# Patient Record
Sex: FEMALE | Race: BLACK OR AFRICAN AMERICAN | NOT HISPANIC OR LATINO | Employment: FULL TIME | URBAN - METROPOLITAN AREA
[De-identification: names, ages, dates, MRNs, and addresses within clinical notes are randomized per-mention and may not be internally consistent; named-entity substitution may affect disease eponyms.]

---

## 2017-09-21 ENCOUNTER — OFFICE VISIT (OUTPATIENT)
Dept: OCCUPATIONAL MEDICINE | Facility: CLINIC | Age: 52
End: 2017-09-21

## 2017-09-21 VITALS
WEIGHT: 170 LBS | SYSTOLIC BLOOD PRESSURE: 129 MMHG | BODY MASS INDEX: 33.38 KG/M2 | DIASTOLIC BLOOD PRESSURE: 82 MMHG | RESPIRATION RATE: 12 BRPM | OXYGEN SATURATION: 98 % | HEART RATE: 68 BPM | HEIGHT: 60 IN

## 2017-09-21 DIAGNOSIS — R52 PAIN: ICD-10-CM

## 2017-09-21 DIAGNOSIS — S46.212A BICEPS STRAIN, LEFT, INITIAL ENCOUNTER: Primary | ICD-10-CM

## 2017-09-21 PROCEDURE — 29125 APPL SHORT ARM SPLINT STATIC: CPT | Mod: LT,S$GLB,, | Performed by: NURSE PRACTITIONER

## 2017-09-21 PROCEDURE — 80305 DRUG TEST PRSMV DIR OPT OBS: CPT | Mod: S$GLB,,, | Performed by: NURSE PRACTITIONER

## 2017-09-21 PROCEDURE — 3074F SYST BP LT 130 MM HG: CPT | Mod: S$GLB,,, | Performed by: NURSE PRACTITIONER

## 2017-09-21 PROCEDURE — 3008F BODY MASS INDEX DOCD: CPT | Mod: S$GLB,,, | Performed by: NURSE PRACTITIONER

## 2017-09-21 PROCEDURE — 3079F DIAST BP 80-89 MM HG: CPT | Mod: S$GLB,,, | Performed by: NURSE PRACTITIONER

## 2017-09-21 PROCEDURE — 99203 OFFICE O/P NEW LOW 30 MIN: CPT | Mod: 25,S$GLB,, | Performed by: NURSE PRACTITIONER

## 2017-09-21 RX ORDER — IBUPROFEN 200 MG
600 TABLET ORAL
COMMUNITY
Start: 2017-09-21

## 2017-09-21 RX ORDER — TELMISARTAN AND HYDROCHLORTHIAZIDE 80; 25 MG/1; MG/1
1 TABLET ORAL DAILY
COMMUNITY

## 2017-09-21 NOTE — PROGRESS NOTES
Subjective:       Patient ID: Katia Arthur is a 52 y.o. female.    Chief Complaint: Arm Pain (09/21/17 onset)    Pt is a nurse at [employer].  She states she was doing some body mechanics exercises at an in-service this morning when she thinks she pulled a muscle in her LEFT HUMERUS. CT She felt a pop over the bicep at that time. Continues to have pain to left upper arm. MWT      Arm Pain    The incident occurred 6 to 12 hours ago. The incident occurred at work. There was no injury mechanism. The pain is present in the upper left arm. The quality of the pain is described as aching. The pain does not radiate. The pain is at a severity of 8/10. The pain is moderate. The pain has been constant since the incident. Associated symptoms include muscle weakness. The symptoms are aggravated by movement and lifting. She has tried nothing for the symptoms. The treatment provided no relief.     Review of Systems   Skin: Negative for color change.   Musculoskeletal: Positive for muscle cramps, muscle weakness and stiffness.       Objective:      Physical Exam   Constitutional: She is oriented to person, place, and time. She appears well-developed and well-nourished.   HENT:   Right Ear: External ear normal.   Left Ear: External ear normal.   Nose: Nose normal.   Eyes: Conjunctivae are normal.   Cardiovascular: Normal rate.    Murmur heard.  Pulmonary/Chest: Effort normal and breath sounds normal.   Abdominal: Soft. Bowel sounds are normal.   Musculoskeletal: Normal range of motion. She exhibits tenderness.        Left shoulder: Normal.        Left elbow: She exhibits normal range of motion, no swelling and no deformity. No tenderness found. No medial epicondyle, no lateral epicondyle and no olecranon process tenderness noted.        Left upper arm: She exhibits tenderness and swelling.        Arms:  Neurological: She is alert and oriented to person, place, and time.   Skin: Skin is warm and dry. Capillary  refill takes less than 2 seconds. No erythema.   Psychiatric: She has a normal mood and affect. Her behavior is normal.       Assessment:       1. Biceps strain, left, initial encounter    2. Pain        Plan:           Medications Ordered This Encounter      ibuprofen (ADVIL) 200 MG tablet          Sig: Take 3 tablets (600 mg total) by mouth every meal as needed for Pain (Monitor BP).  Patient Instructions: Apply ice 24-48 hours then apply heat/warm soaks   Restrictions: Limited use of left hand and arm, No lifting/pushing/pulling more than 10 lbs  Return in about 1 week (around 9/28/2017).    Ace bandage. Ice 15 min several times a day through Saturday then warm soaks.

## 2017-09-21 NOTE — LETTER
[facility] [place] [address]  Phone: [phone]  Fax: [phone]    Pt Name: Katia Arthur  Injury Date: 09/21/17   Employee ID:  Date of First Treatment: 09/21/2017   Company: [employer].            Appointment Time: 02:40 PM Arrived:  2:55 PM CDT   Physician: Alisha Juarez NP DEPARTED: 1700 PM           Office Treatment: Katia was seen today for arm pain.    Diagnoses and all orders for this visit: EXAM, DRUG SCREEN, ELASTIC BANDAGE, LIGHT DUTY     Biceps strain, left, initial encounter  -     ibuprofen (ADVIL) 200 MG tablet; Take 3 tablets (600 mg total) by mouth every meal as needed for Pain (Monitor BP).  Pain  -     X-Ray Humerus 2 View Left;  -     X-Ray Elbow Complete 3 view Left;      Patient Instructions: Apply ice 24-48 hours then apply heat/warm soaks    Restrictions: Limited use of left hand and arm, No lifting/pushing/pulling more than 10 lbs       Return Appointment:   09/28/17 @ 0830 AM

## 2017-09-28 ENCOUNTER — OFFICE VISIT (OUTPATIENT)
Dept: OCCUPATIONAL MEDICINE | Facility: CLINIC | Age: 52
End: 2017-09-28
Payer: OTHER MISCELLANEOUS

## 2017-09-28 DIAGNOSIS — M79.602 PAIN OF LEFT ARM: ICD-10-CM

## 2017-09-28 DIAGNOSIS — S46.212D BICEPS STRAIN, LEFT, SUBSEQUENT ENCOUNTER: Primary | ICD-10-CM

## 2017-09-28 PROCEDURE — 99213 OFFICE O/P EST LOW 20 MIN: CPT | Mod: S$GLB,,, | Performed by: PREVENTIVE MEDICINE

## 2017-09-28 PROCEDURE — 3008F BODY MASS INDEX DOCD: CPT | Mod: S$GLB,,, | Performed by: PREVENTIVE MEDICINE

## 2017-09-28 NOTE — PROGRESS NOTES
Subjective:       Patient ID: Katia Arthur is a 52 y.o. female.    Chief Complaint: Arm Pain (left)    Patient states that she still is experiencing arm pain to the left bicep and forearm with limited ROM      Arm Pain    Incident onset: 09/21/2017. The incident occurred at work. The injury mechanism was repetitive motion. The pain is present in the left forearm. The quality of the pain is described as aching. The pain is at a severity of 6/10. Pertinent negatives include no chest pain or numbness. She has tried ice and heat for the symptoms. The treatment provided mild relief.     Review of Systems   Constitution: Negative for chills, fever and weakness.   HENT: Negative for congestion, ear pain and nosebleeds.    Eyes: Negative for blurred vision and pain.   Cardiovascular: Negative for chest pain and palpitations.   Respiratory: Negative for cough, shortness of breath and wheezing.    Skin: Negative for dry skin, itching and rash.   Musculoskeletal: Positive for joint pain, muscle weakness, myalgias and stiffness. Negative for arthritis, back pain, gout, joint swelling and neck pain.   Gastrointestinal: Negative for abdominal pain, constipation, diarrhea, nausea and vomiting.   Genitourinary: Negative for dysuria, frequency and hematuria.   Neurological: Negative for dizziness, headaches, numbness and seizures.   Allergic/Immunologic: Negative for hives.   All other systems reviewed and are negative.      Objective:      Physical Exam   Constitutional: She appears well-developed and well-nourished.   HENT:   Head: Normocephalic.   Eyes: Pupils are equal, round, and reactive to light.   Neck: Normal range of motion.   Cardiovascular: Normal rate.    Pulmonary/Chest: Effort normal.   Musculoskeletal:        Lumbar back: She exhibits no bony tenderness, no swelling, no edema, no deformity, no laceration, no spasm and normal pulse.        Left upper arm: She exhibits tenderness. She exhibits no bony  tenderness, no swelling, no edema, no deformity and no laceration.        Arms:  Neurological: She is alert.   No focal neurologic deficits   Skin: Skin is warm.   Psychiatric: She has a normal mood and affect.   Nursing note and vitals reviewed.      Assessment:       1. Biceps strain, left, subsequent encounter    2. Pain of left arm        Plan:            Patient Instructions: Daily home exercises/warm soaks (Continue taking Advil 200mg as needed for pain)   Restrictions: No lifting/pushing/pulling more than 25 lbs, Limited use of left hand and arm  Return in about 1 week (around 10/5/2017).

## 2017-09-28 NOTE — LETTER
[facility]  [place]  [facility] [place] [address]  Phone: [phone]  Fax: [phone]    Pt Name: Katia Arthur     Employee ID: [id number] Date of First Treatment: 09/28/2017   Company: [employer]            Appointment Time: 08:15 AM Arrived:  8:30 AM CDT   Physician: Carlos Becerra MD Time Out: 09:45 AM           Office Treatment: Katia was seen today for arm pain.    Diagnoses and all orders for this visit:    Biceps strain, left, subsequent encounter    Pain of left arm       Patient Instructions: Daily home exercises/warm soaks (Continue taking Advil 200mg as needed for pain)    Restrictions: No lifting/pushing/pulling more than 25 lbs, Limited use of left hand and arm       Return Appointment: 10/5/2017 at 1000 AM

## 2017-10-06 ENCOUNTER — OFFICE VISIT (OUTPATIENT)
Dept: OCCUPATIONAL MEDICINE | Facility: CLINIC | Age: 52
End: 2017-10-06

## 2017-10-06 DIAGNOSIS — S46.212D BICEPS STRAIN, LEFT, SUBSEQUENT ENCOUNTER: Primary | ICD-10-CM

## 2017-10-06 PROCEDURE — 99213 OFFICE O/P EST LOW 20 MIN: CPT | Mod: S$GLB,,, | Performed by: NURSE PRACTITIONER

## 2017-10-06 NOTE — LETTER
[facility]  [place]  [address] [place] [address] [place] [address]  Phone: [phone]  Fax: [phone]    Pt Name: Katia Arthur  Injury Date: 09/21/17   Employee ID:  Date of Treatment: 10/06/2017   Company: [employer].            Appointment Time: 09:45 AM Arrived: 31765: AM CDT   Physician: Santana Soto NP Departed:  1230 PM           Office Treatment: Katia was seen today for arm pain.    Diagnoses and all orders for this visit: EXAM, CONTINUE LIGHT DUTY    Biceps strain, left, subsequent encounter     Patient Instructions: Daily home exercises/warm soaks, Elevated affected area, Attention not to aggravate affected area    Restrictions: No lifting/pushing/pulling more than 10 lbs, No above the shoulder/overhead work, Avoid frequent bending/lifting/twisting       Return Appointment: FOLLOW UP ON 10/13/17 @ 1030 AM

## 2017-10-06 NOTE — PATIENT INSTRUCTIONS
Muscle Strain in the Extremities  A muscle strain is a stretching and tearing of muscle fibers. This causes pain, especially when you move that muscle. There may also be some swelling and bruising.  Home care  · Keep the hurt area raised to reduce pain and swelling. This is especially important during the first 48 hours.  · Apply an ice pack over the injured area for 15 to 20 minutes every 3 to 6 hours. You should do this for the first 24 to 48 hours. You can make an ice pack by filling a plastic bag that seals at the top with ice cubes and then wrapping it with a thin towel. Be careful not to injure your skin with the ice treatments. Ice should never be applied directly to skin. Continue the use of ice packs for relief of pain and swelling as needed. After 48 hours, apply heat (warm shower or warm bath) for 15 to 20 minutes several times a day, or alternate ice and heat.  · You may use over-the-counter pain medicine to control pain, unless another medicine was prescribed. If you have chronic liver or kidney disease or ever had a stomach ulcer or GI bleeding, talk with your healthcare provider before using these medicines.  · For leg strains: If crutches have been recommended, dont put full weight on the hurt leg until you can do so without pain. You can return to sports when you are able to hop and run on the injured leg without pain.  Follow-up care  Follow up with your healthcare provider, or as advised.  When to seek medical advice  Call your healthcare provider right away if any of these occur:  · The toes of the injured leg become swollen, cold, blue, numb, or tingly  · Pain or swelling increases  Date Last Reviewed: 11/19/2015 © 2000-2017 [employer]. [address] [place] [address], [place] [address]. All rights reserved. This information is not intended as a substitute for professional medical care. Always follow your healthcare professional's instructions.        Self-Care for Strains and  Sprains  Most minor strains and sprains can be treated with self-care. Recovering from a strain or sprain may take 6 to 8 weeks. Your self-care goal is to reduce pain and immobilize the injury to speed healing.     A sprain injures ligaments (tissue that connects bones to bones).        A strain injures muscles or tendons (tissue that connects muscles to bones).   Support the injured area  Wrapping the injured area provides support for short, necessary activities. Be careful not to wrap the area too tightly. This could cut off the blood supply.  · Support a wrist, elbow, or shoulder with a sling.  · Wrap an ankle or knee with an elastic bandage.  · Tape a finger or toe to the one next to it.  Use cold and heat  Cold reduces swelling. Both cold and heat reduce pain. Heat should not be used in the initial treatment of the injury. When using cold or heat, always place a towel between the pack and your skin.  · Apply ice or a cold pack 10 to 15 minutes every hour youre awake for the first 2 days.  · After the swelling goes down, use cold or heat to control pain. Dont use heat late in the day, since it can cause swelling when youre not active.  Rest and elevate  Rest and elevation help your injury heal faster.  · Raise the injured area above your heart level.  · Keep the injured area from moving.  · Limit the use of the joint or limb.  Use medicine  · Aspirin reduces pain and swelling. (Note: Dont give aspirin to a child 18 or younger unless prescribed by the doctor.)  · Aspirin substitutes, such as ibuprofen, can reduce pain. Some substitutes reduce swelling, too. Ask your pharmacist which substitutes you can use.  Call your doctor if:  · The injured joint wont move, or bones make a grating sound when they move.  · You cant put weight on the injured area, even after 24 hours.  · The injured body part is cold, blue, or numb.  · The joint or limb appears bent or crooked.  · Pain increases or doesnt improve in 4  days.  · When pressing along the injured area, you notice a spot that is especially painful.   Date Last Reviewed: 9/29/2015  © 1036-7032 [employer]. [address] [place] [address], [place] [address]. All rights reserved. This information is not intended as a substitute for professional medical care. Always follow your healthcare professional's instructions.

## 2017-10-06 NOTE — PROGRESS NOTES
Subjective:       Patient ID: Katia Arthur is a 52 y.o. female.    Chief Complaint: Arm Pain (Follow up from LEFT ARM injury 9/21/17)    Pt is here for a follow up on a LEFT ARM injury from 9/21/17.  She has been doing light duty with no problems.  CT      Arm Pain    The incident occurred more than 1 week ago (left arm injury . Denies previous injury to left arm). The incident occurred at work. The pain is present in the left forearm (left bicep muscle ). The quality of the pain is described as aching and cramping. The pain does not radiate. The pain is at a severity of 4/10. The pain is mild. The pain has been improving since the incident. Associated symptoms include muscle weakness. Pertinent negatives include no chest pain, numbness or tingling. The symptoms are aggravated by movement and lifting. She has tried heat, ice, elevation and NSAIDs for the symptoms. The treatment provided moderate relief.     Review of Systems   Constitution: Negative for chills and fever.   Cardiovascular: Negative for chest pain and dyspnea on exertion.   Respiratory: Negative for cough and shortness of breath.    Endocrine: Negative for polydipsia, polyphagia and polyuria.   Musculoskeletal: Positive for muscle cramps, muscle weakness and stiffness. Negative for falls, joint pain and joint swelling.   Gastrointestinal: Negative for constipation, diarrhea and heartburn.   Neurological: Negative for numbness and tingling.       Objective:      Physical Exam   Constitutional: She is oriented to person, place, and time. She appears well-developed and well-nourished.   Cardiovascular: Normal rate.    Murmur.  Denies s/s.  Denies sob, CP and or dizziness   Pulmonary/Chest: Effort normal and breath sounds normal.   Abdominal: Soft. Bowel sounds are normal.   Musculoskeletal:        Left shoulder: Normal.        Left elbow: Normal.        Left wrist: Normal.        Left upper arm: She exhibits tenderness. She exhibits no bony  tenderness, no swelling, no edema and no laceration.        Arms:  Neurological: She is alert and oriented to person, place, and time. She has normal strength. No cranial nerve deficit or sensory deficit.   Reflex Scores:       Bicep reflexes are 2+ on the right side and 2+ on the left side.       Brachioradialis reflexes are 2+ on the right side and 2+ on the left side.  Skin: Skin is warm, dry and intact.   Psychiatric: She has a normal mood and affect. Her behavior is normal.   Nursing note reviewed.      Assessment:       1. Biceps strain, left, subsequent encounter        Plan:       Katia was seen today for arm pain.    Diagnoses and all orders for this visit:    Biceps strain, left, subsequent encounter      Current Outpatient Prescriptions on File Prior to Visit   Medication Sig Dispense Refill    ibuprofen (ADVIL) 200 MG tablet Take 3 tablets (600 mg total) by mouth every meal as needed for Pain (Monitor BP).      telmisartan-hydrochlorothiazide (MICARDIS HCT) 80-25 mg per tablet Take 1 tablet by mouth once daily.       No current facility-administered medications on file prior to visit.       Ibuprofen as directed and needed  Patient Instructions: Daily home exercises/warm soaks, Elevated affected area, Attention not to aggravate affected area   Restrictions: No lifting/pushing/pulling more than 10 lbs, No above the shoulder/overhead work, Avoid frequent bending/lifting/twisting  Return in about 7 days (around 10/13/2017).

## 2017-10-18 ENCOUNTER — OFFICE VISIT (OUTPATIENT)
Dept: OCCUPATIONAL MEDICINE | Facility: CLINIC | Age: 52
End: 2017-10-18

## 2017-10-18 DIAGNOSIS — M79.602 PAIN OF LEFT ARM: ICD-10-CM

## 2017-10-18 DIAGNOSIS — S46.212D BICEPS STRAIN, LEFT, SUBSEQUENT ENCOUNTER: Primary | ICD-10-CM

## 2017-10-18 PROCEDURE — 99213 OFFICE O/P EST LOW 20 MIN: CPT | Mod: S$GLB,,, | Performed by: NURSE PRACTITIONER

## 2017-10-18 NOTE — PATIENT INSTRUCTIONS
Muscle Strain in the Extremities  A muscle strain is a stretching and tearing of muscle fibers. This causes pain, especially when you move that muscle. There may also be some swelling and bruising.  Home care  · Keep the hurt area raised to reduce pain and swelling. This is especially important during the first 48 hours.  · Apply an ice pack over the injured area for 15 to 20 minutes every 3 to 6 hours. You should do this for the first 24 to 48 hours. You can make an ice pack by filling a plastic bag that seals at the top with ice cubes and then wrapping it with a thin towel. Be careful not to injure your skin with the ice treatments. Ice should never be applied directly to skin. Continue the use of ice packs for relief of pain and swelling as needed. After 48 hours, apply heat (warm shower or warm bath) for 15 to 20 minutes several times a day, or alternate ice and heat.  · You may use over-the-counter pain medicine to control pain, unless another medicine was prescribed. If you have chronic liver or kidney disease or ever had a stomach ulcer or GI bleeding, talk with your healthcare provider before using these medicines.  · For leg strains: If crutches have been recommended, dont put full weight on the hurt leg until you can do so without pain. You can return to sports when you are able to hop and run on the injured leg without pain.  Follow-up care  Follow up with your healthcare provider, or as advised.  When to seek medical advice  Call your healthcare provider right away if any of these occur:  · The toes of the injured leg become swollen, cold, blue, numb, or tingly  · Pain or swelling increases  Date Last Reviewed: 11/19/2015 © 2000-2017 [employer]. [address] [place] [address], [place] [address]. All rights reserved. This information is not intended as a substitute for professional medical care. Always follow your healthcare professional's instructions.        Self-Care for Strains and  Sprains  Most minor strains and sprains can be treated with self-care. Recovering from a strain or sprain may take 6 to 8 weeks. Your self-care goal is to reduce pain and immobilize the injury to speed healing.     A sprain injures ligaments (tissue that connects bones to bones).        A strain injures muscles or tendons (tissue that connects muscles to bones).   Support the injured area  Wrapping the injured area provides support for short, necessary activities. Be careful not to wrap the area too tightly. This could cut off the blood supply.  · Support a wrist, elbow, or shoulder with a sling.  · Wrap an ankle or knee with an elastic bandage.  · Tape a finger or toe to the one next to it.  Use cold and heat  Cold reduces swelling. Both cold and heat reduce pain. Heat should not be used in the initial treatment of the injury. When using cold or heat, always place a towel between the pack and your skin.  · Apply ice or a cold pack 10 to 15 minutes every hour youre awake for the first 2 days.  · After the swelling goes down, use cold or heat to control pain. Dont use heat late in the day, since it can cause swelling when youre not active.  Rest and elevate  Rest and elevation help your injury heal faster.  · Raise the injured area above your heart level.  · Keep the injured area from moving.  · Limit the use of the joint or limb.  Use medicine  · Aspirin reduces pain and swelling. (Note: Dont give aspirin to a child 18 or younger unless prescribed by the doctor.)  · Aspirin substitutes, such as ibuprofen, can reduce pain. Some substitutes reduce swelling, too. Ask your pharmacist which substitutes you can use.  Call your doctor if:  · The injured joint wont move, or bones make a grating sound when they move.  · You cant put weight on the injured area, even after 24 hours.  · The injured body part is cold, blue, or numb.  · The joint or limb appears bent or crooked.  · Pain increases or doesnt improve in 4  days.  · When pressing along the injured area, you notice a spot that is especially painful.   Date Last Reviewed: 9/29/2015  © 0272-1308 [employer]. [address] [place] [address], [place] [address]. All rights reserved. This information is not intended as a substitute for professional medical care. Always follow your healthcare professional's instructions.

## 2017-10-18 NOTE — PROGRESS NOTES
Subjective:       Patient ID: Katia Arthur is a 52 y.o. female.    Chief Complaint: Arm Pain (left)    Patient states that the pain has improved but still aches with frequent ROM      Arm Pain    Incident onset: 09/21/2017. The incident occurred at work. The injury mechanism was twisted. The pain is present in the upper right arm and right forearm. The quality of the pain is described as aching. The pain does not radiate. The pain is at a severity of 4/10. The pain is mild. The pain has been fluctuating since the incident. Associated symptoms include muscle weakness. Pertinent negatives include no chest pain, numbness or tingling. The symptoms are aggravated by movement. She has tried heat for the symptoms. The treatment provided mild relief.     Review of Systems   Constitution: Negative for chills, fever and weakness.   HENT: Negative for congestion, ear pain and nosebleeds.    Eyes: Negative for blurred vision and pain.   Cardiovascular: Negative for chest pain and palpitations.   Respiratory: Negative for cough, shortness of breath and wheezing.    Skin: Negative for dry skin, itching and rash.   Musculoskeletal: Positive for joint pain, muscle weakness and stiffness. Negative for arthritis, back pain, gout, joint swelling and neck pain.   Gastrointestinal: Negative for abdominal pain, constipation, diarrhea, nausea and vomiting.   Genitourinary: Negative for dysuria, frequency and hematuria.   Neurological: Negative for dizziness, headaches, numbness, seizures and tingling.   Allergic/Immunologic: Negative for hives.   All other systems reviewed and are negative.      Objective:      Physical Exam   Constitutional: She is oriented to person, place, and time. She appears well-developed and well-nourished.   Cardiovascular: Normal rate and regular rhythm.    Pulmonary/Chest: Effort normal and breath sounds normal.   Abdominal: Soft. Bowel sounds are normal.   Musculoskeletal: She exhibits tenderness.         Left elbow: Normal.        Arms:  Neurological: She is alert and oriented to person, place, and time.   Skin: Skin is warm and dry.   Psychiatric: She has a normal mood and affect. Her behavior is normal.       Assessment:       1. Biceps strain, left, subsequent encounter    2. Pain of left arm        Plan:     Katia was seen today for arm pain.    Diagnoses and all orders for this visit:    Biceps strain, left, subsequent encounter    Pain of left arm          Katia was seen today for arm pain.    Diagnoses and all orders for this visit:    Biceps strain, left, subsequent encounter    Pain of left arm    PT HAS BEEN REQUESTED PENDING AUTHORIZATION  CONTINUE MEDICATION AS DIRECTED AND AS NEEDED  Patient Instructions: Daily home exercises/warm soaks, PT to be scheduled once authorized   Restrictions: No lifting/pushing/pulling more than 10 lbs, No above the shoulder/overhead work, Avoid frequent bending/lifting/twisting  No Follow-up on file.

## 2017-10-18 NOTE — LETTER
[facility]  [place]  [facility] [place] [address]  Phone: [phone]  Fax: [phone]    Pt Name: Katia Arthur  Injury Date: 09/21/2017   Employee ID: [id number] Date of First Treatment: 10/18/2017   Company: [employer].            Appointment Time: 11:05 AM Arrived: 11:20 AM CDT   Physician: Santana Soto NP Time Out:  13:15 PM           Office Treatment: Katia was seen today for arm pain.    Diagnoses and all orders for this visit:    Biceps strain, left, subsequent encounter    Pain of left arm       Patient Instructions: Daily home exercises/warm soaks, PT to be scheduled once authorized    Restrictions: No lifting/pushing/pulling more than 10 lbs, No above the shoulder/overhead work, Avoid frequent bending/lifting/twisting       Return Appointment: 10/25/2017 at 11:00 AM

## 2017-11-08 ENCOUNTER — OFFICE VISIT (OUTPATIENT)
Dept: OCCUPATIONAL MEDICINE | Facility: CLINIC | Age: 52
End: 2017-11-08
Payer: OTHER MISCELLANEOUS

## 2017-11-08 DIAGNOSIS — M79.602 PAIN OF LEFT ARM: ICD-10-CM

## 2017-11-08 DIAGNOSIS — S46.212D BICEPS STRAIN, LEFT, SUBSEQUENT ENCOUNTER: Primary | ICD-10-CM

## 2017-11-08 PROCEDURE — 99213 OFFICE O/P EST LOW 20 MIN: CPT | Mod: S$GLB,,, | Performed by: NURSE PRACTITIONER

## 2017-11-08 NOTE — PROGRESS NOTES
Subjective:       Patient ID: Katia Arthur is a 52 y.o. female.    Chief Complaint: Arm Pain (FOLLOW UP from injury on 09/21/17)    Pt is here for a FOLLOW UP on a LEFT ARM injury from 09/21/17.  She has begun PT and said it is helping TREMENDOUSLY.  She completed about 3 sessions and is doing well on LIGHT DUTY.  CT      Arm Pain    The incident occurred more than 1 week ago. The incident occurred at work. The injury mechanism was a fall. The pain is present in the left forearm. The quality of the pain is described as aching. The pain does not radiate. The pain is at a severity of 4/10. The pain is mild. The pain has been fluctuating since the incident. Associated symptoms include muscle weakness. Pertinent negatives include no chest pain or numbness. The symptoms are aggravated by movement, palpation and lifting. The treatment provided moderate (BETTER WITH PT) relief.     Review of Systems   Cardiovascular: Negative for chest pain, claudication and dyspnea on exertion.   Respiratory: Negative for cough.    Musculoskeletal: Positive for muscle cramps, muscle weakness and stiffness.   Neurological: Negative for numbness and sensory change.   Psychiatric/Behavioral: Negative for altered mental status. The patient is not nervous/anxious.        Objective:      Physical Exam   Constitutional: She is oriented to person, place, and time. She appears well-developed and well-nourished.   Cardiovascular: Normal rate and regular rhythm.    Pulmonary/Chest: Effort normal and breath sounds normal.   Abdominal: Soft. Bowel sounds are normal.   Musculoskeletal: She exhibits tenderness.        Left shoulder: She exhibits tenderness and decreased strength. She exhibits no bony tenderness, no swelling, no pain and no spasm.        Right elbow: Normal.       Left elbow: She exhibits decreased range of motion. Tenderness found.        Left wrist: She exhibits decreased range of motion. She exhibits no tenderness, no bony  tenderness, no crepitus and no deformity.        Left upper arm: She exhibits tenderness.        Left forearm: She exhibits tenderness. She exhibits no bony tenderness, no swelling and no deformity.        Arms:       Left hand: She exhibits normal range of motion, no tenderness, no bony tenderness, normal capillary refill and no swelling. Normal sensation noted. Normal strength noted.   Neurological: She is alert and oriented to person, place, and time. No cranial nerve deficit or sensory deficit. GCS eye subscore is 4. GCS verbal subscore is 5. GCS motor subscore is 6.   Reflex Scores:       Tricep reflexes are 2+ on the right side and 2+ on the left side.       Bicep reflexes are 2+ on the right side and 2+ on the left side.  Skin: Skin is warm and dry. Capillary refill takes less than 2 seconds.   Psychiatric: She has a normal mood and affect. Her behavior is normal.       Assessment:       1. Biceps strain, left, subsequent encounter    2. Pain of left arm        Plan:       Katia was seen today for arm pain.    Diagnoses and all orders for this visit:    Biceps strain, left, subsequent encounter    Pain of left arm         Patient Instructions: Continue Physical Therapy, Attention not to aggravate affected area, Daily home exercises/warm soaks   Restrictions: Limited use of left hand and arm, No lifting/pushing/pulling more than 10 lbs, No above the shoulder/overhead work, Avoid frequent bending/lifting/twisting  Return in about 2 weeks (around 11/21/2017).

## 2017-11-08 NOTE — LETTER
[facility]  [place]  [address] [place] [address] [place] [address]  Phone: [phone]  Fax: [phone]    Pt Name: Katia Arthur  Injury Date: 09/21/17   Employee ID:  Date of Treatment: 11/08/2017   Company: [employer].            Appointment Time: 11:15 AM Arrived: 11:30 AM CST   Physician: Santana Soto NP Departed: 1400 PM           Office Treatment: Katia was seen today for arm pain.    Diagnoses and all orders for this visit: EXAM, CONTINUE PT AND LIGHT DUTY    Biceps strain, left, subsequent encounter  Pain of left arm     Patient Instructions: Continue Physical Therapy, Attention not to aggravate affected area, Daily home exercises/warm soaks    Restrictions: Limited use of left hand and arm, No lifting/pushing/pulling more than 10 lbs, No above the shoulder/overhead work, Avoid frequent bending/lifting/twisting       Return Appointment: FOLLOW UP 11/21/17 @ 09

## 2017-11-21 ENCOUNTER — OFFICE VISIT (OUTPATIENT)
Dept: OCCUPATIONAL MEDICINE | Facility: CLINIC | Age: 52
End: 2017-11-21
Payer: OTHER MISCELLANEOUS

## 2017-11-21 DIAGNOSIS — M79.602 PAIN OF LEFT ARM: ICD-10-CM

## 2017-11-21 DIAGNOSIS — S46.212D BICEPS STRAIN, LEFT, SUBSEQUENT ENCOUNTER: Primary | ICD-10-CM

## 2017-11-21 PROCEDURE — 99213 OFFICE O/P EST LOW 20 MIN: CPT | Mod: S$GLB,,, | Performed by: NURSE PRACTITIONER

## 2017-11-21 NOTE — PATIENT INSTRUCTIONS
Muscle Strain in the Extremities  A muscle strain is a stretching and tearing of muscle fibers. This causes pain, especially when you move that muscle. There may also be some swelling and bruising.  Home care  · Keep the hurt area raised to reduce pain and swelling. This is especially important during the first 48 hours.  · Apply an ice pack over the injured area for 15 to 20 minutes every 3 to 6 hours. You should do this for the first 24 to 48 hours. You can make an ice pack by filling a plastic bag that seals at the top with ice cubes and then wrapping it with a thin towel. Be careful not to injure your skin with the ice treatments. Ice should never be applied directly to skin. Continue the use of ice packs for relief of pain and swelling as needed. After 48 hours, apply heat (warm shower or warm bath) for 15 to 20 minutes several times a day, or alternate ice and heat.  · You may use over-the-counter pain medicine to control pain, unless another medicine was prescribed. If you have chronic liver or kidney disease or ever had a stomach ulcer or GI bleeding, talk with your healthcare provider before using these medicines.  · For leg strains: If crutches have been recommended, dont put full weight on the hurt leg until you can do so without pain. You can return to sports when you are able to hop and run on the injured leg without pain.  Follow-up care  Follow up with your healthcare provider, or as advised.  When to seek medical advice  Call your healthcare provider right away if any of these occur:  · The toes of the injured leg become swollen, cold, blue, numb, or tingly  · Pain or swelling increases  Date Last Reviewed: 11/19/2015 © 2000-2017 [employer]. [address] [place] [address], [place] [address]. All rights reserved. This information is not intended as a substitute for professional medical care. Always follow your healthcare professional's instructions.        Self-Care for Strains and  Sprains  Most minor strains and sprains can be treated with self-care. Recovering from a strain or sprain may take 6 to 8 weeks. Your self-care goal is to reduce pain and immobilize the injury to speed healing.     A sprain injures ligaments (tissue that connects bones to bones).        A strain injures muscles or tendons (tissue that connects muscles to bones).   Support the injured area  Wrapping the injured area provides support for short, necessary activities. Be careful not to wrap the area too tightly. This could cut off the blood supply.  · Support a wrist, elbow, or shoulder with a sling.  · Wrap an ankle or knee with an elastic bandage.  · Tape a finger or toe to the one next to it.  Use cold and heat  Cold reduces swelling. Both cold and heat reduce pain. Heat should not be used in the initial treatment of the injury. When using cold or heat, always place a towel between the pack and your skin.  · Apply ice or a cold pack 10 to 15 minutes every hour youre awake for the first 2 days.  · After the swelling goes down, use cold or heat to control pain. Dont use heat late in the day, since it can cause swelling when youre not active.  Rest and elevate  Rest and elevation help your injury heal faster.  · Raise the injured area above your heart level.  · Keep the injured area from moving.  · Limit the use of the joint or limb.  Use medicine  · Aspirin reduces pain and swelling. (Note: Dont give aspirin to a child 18 or younger unless prescribed by the doctor.)  · Aspirin substitutes, such as ibuprofen, can reduce pain. Some substitutes reduce swelling, too. Ask your pharmacist which substitutes you can use.  Call your doctor if:  · The injured joint wont move, or bones make a grating sound when they move.  · You cant put weight on the injured area, even after 24 hours.  · The injured body part is cold, blue, or numb.  · The joint or limb appears bent or crooked.  · Pain increases or doesnt improve in 4  days.  · When pressing along the injured area, you notice a spot that is especially painful.   Date Last Reviewed: 9/29/2015  © 1128-2678 [employer]. [address] [place] [address], [place] [address]. All rights reserved. This information is not intended as a substitute for professional medical care. Always follow your healthcare professional's instructions.

## 2017-11-21 NOTE — LETTER
[facility]  [place]  [facility] [place] [address]  Phone: [phone]  Fax: [phone]    Pt Name: Katia Arthur  Injury Date: 09/21/2017   Employee ID:  Date of Treatment: 11/21/2017   Company: [employer].            Appointment Time: 01:05 PM Arrived:  1:20 PM CST   Physician: Santana Soto NP Departed: 2:20 PM           Office Treatment: Katia was seen today for shoulder pain.    Diagnoses and all orders for this visit: EXAM, FOLLOW UP    Biceps strain, left, subsequent encounter    Pain of left arm     Patient Instructions: Daily home exercises/warm soaks, Attention not to aggravate affected area    Restrictions: Limited use of left hand and arm, No lifting/pushing/pulling more than 10 lbs, Avoid frequent bending/lifting/twisting, No above the shoulder/overhead work FOLLOW UP 11/28/2017 @12:00pm       Return Appointment: FOLLOW UP 11/28/2017 @12:00PM

## 2017-11-21 NOTE — PROGRESS NOTES
Subjective:       Patient ID: Katia Arthur is a 52 y.o. female.    Chief Complaint: Shoulder Pain (RV)    Pt returned to the clinic today for a follow up visit for right shoulder pain. Pt states her injury has not improved since her last visit. IJ reports she has PT this coming up Monday.      Shoulder Pain    The pain is present in the right shoulder. This is a recurrent problem. The current episode started more than 1 month ago. There has been no history of extremity trauma. The problem occurs constantly. The problem has been unchanged. The pain is at a severity of 4/10. The pain is mild. Associated symptoms include stiffness. Pertinent negatives include no fever or headaches. The symptoms are aggravated by activity. She has tried nothing for the symptoms. The treatment provided no relief.     Review of Systems   Constitution: Negative for chills and fever.   HENT: Negative for sore throat.    Eyes: Negative for blurred vision.   Cardiovascular: Negative for chest pain.   Respiratory: Negative for shortness of breath.    Skin: Negative for rash.   Musculoskeletal: Positive for joint pain (biceps ) and stiffness. Negative for back pain.   Gastrointestinal: Negative for abdominal pain, diarrhea, nausea and vomiting.   Neurological: Negative for headaches.   Psychiatric/Behavioral: The patient is not nervous/anxious.        Objective:      Physical Exam   Constitutional: She is oriented to person, place, and time. She appears well-developed and well-nourished.   Neck: Normal range of motion. Neck supple.   Cardiovascular: Normal rate and regular rhythm.    Pulmonary/Chest: Effort normal and breath sounds normal.   Abdominal: Soft. Bowel sounds are normal.   Musculoskeletal:        Left shoulder: Normal.        Left elbow: Normal.        Left wrist: Normal.        Left upper arm: She exhibits tenderness. She exhibits no swelling and no deformity.        Left forearm: She exhibits tenderness and bony  tenderness. She exhibits no swelling and no edema.        Arms:  Neurological: She is alert and oriented to person, place, and time. No cranial nerve deficit or sensory deficit.   Reflex Scores:       Bicep reflexes are 2+ on the left side.       Brachioradialis reflexes are 2+ on the left side.  Skin: Skin is warm, dry and intact. Capillary refill takes less than 2 seconds.   Psychiatric: She has a normal mood and affect. Her behavior is normal.       Assessment:       1. Biceps strain, left, subsequent encounter    2. Pain of left arm        Plan:     Katia was seen today for shoulder pain.    Diagnoses and all orders for this visit:    Biceps strain, left, subsequent encounter    Pain of left arm    CONTINUE PT  REASSESS IN ONE WEEK WITH DR JENKINS AND LEIF HEWITT, NP  CALL WITH ANY QUESTIONS AND OR CONCERNS       Patient Instructions: Daily home exercises/warm soaks, Attention not to aggravate affected area   Restrictions: Limited use of left hand and arm, No lifting/pushing/pulling more than 10 lbs, Avoid frequent bending/lifting/twisting, No above the shoulder/overhead work  Return in about 7 days (around 11/28/2017).

## 2017-12-15 ENCOUNTER — OFFICE VISIT (OUTPATIENT)
Dept: OCCUPATIONAL MEDICINE | Facility: CLINIC | Age: 52
End: 2017-12-15
Payer: OTHER MISCELLANEOUS

## 2017-12-15 DIAGNOSIS — S46.212D BICEPS STRAIN, LEFT, SUBSEQUENT ENCOUNTER: Primary | ICD-10-CM

## 2017-12-15 PROCEDURE — 99214 OFFICE O/P EST MOD 30 MIN: CPT | Mod: S$GLB,,, | Performed by: PHYSICIAN ASSISTANT

## 2017-12-15 NOTE — LETTER
[facility]  [place] [address] [place] [address]  Phone: [phone]  Fax: [phone]    Pt Name: Katia Arthur  Injury Date:09/21/17    Employee ID:  Date of Treatment: 12/15/2017   Company: [email or web address].            Appointment Time: 08:55 AM Arrived:  9:10 AM CST   Physician: Vince Doe PA-C Departed: 0955 AM            Office Treatment: Katia was seen today for shoulder pain.    Diagnoses and all orders for this visit: EXAM, MRI TO BE SCHEDULED ONCE APPROVED, LIGHT DUTY    Biceps strain, left, subsequent encounter  -     MRI Upper Extremity Without Contrast Left (ELBOW)     Patient Instructions: Daily home exercises/warm soaks, MRI to be scheduled once authorized (Continue OTC ibuprofen as needed for pain. )    Restrictions: No lifting/pushing/pulling more than 10 lbs, Limited use of left hand and arm       Return Appointment: FOLLOW UP ON 12/22/17 @ 0845 AM

## 2017-12-15 NOTE — PROGRESS NOTES
Subjective:       Patient ID: Katia Arthur is a 52 y.o. female.    Chief Complaint: Shoulder Pain (Left)    Pt returned to the clinic today for left shoulder pain. Pt states her injury has gotten worse since her last office visit and her pain has began to radiates down her entire arm.IJ       Shoulder Pain    The pain is present in the left shoulder. This is a recurrent problem. The current episode started more than 1 month ago. There has been a history of trauma. The problem occurs constantly. The problem has been unchanged. The quality of the pain is described as aching. The pain is at a severity of 7/10. The pain is mild. Associated symptoms include stiffness. Pertinent negatives include no fever, headaches or numbness. The symptoms are aggravated by activity. The treatment provided no relief.     Review of Systems   Constitution: Negative for chills and fever.   HENT: Negative for hearing loss, nosebleeds and sore throat.    Eyes: Negative for blurred vision and redness.   Cardiovascular: Negative for chest pain and syncope.   Respiratory: Negative for cough and shortness of breath.    Hematologic/Lymphatic: Negative for bleeding problem.   Skin: Negative for color change and rash.   Musculoskeletal: Positive for joint pain (left elbow) and stiffness. Negative for back pain and neck pain.   Gastrointestinal: Negative for abdominal pain, diarrhea, nausea and vomiting.   Neurological: Negative for headaches, numbness and paresthesias.   Psychiatric/Behavioral: The patient is not nervous/anxious.        Objective:      Physical Exam   Constitutional: She appears well-developed and well-nourished. She is active. No distress.   HENT:   Head: Normocephalic and atraumatic.   Right Ear: Hearing and external ear normal.   Left Ear: Hearing and external ear normal.   Nose: Nose normal. No nasal deformity. No epistaxis.   Mouth/Throat: Oropharynx is clear and moist and mucous membranes are normal.   Eyes:  Conjunctivae and lids are normal. No scleral icterus.   Neck: Trachea normal and normal range of motion.   Cardiovascular: Intact distal pulses and normal pulses.    Pulmonary/Chest: Effort normal. No stridor. No respiratory distress.   Musculoskeletal:        Left elbow: She exhibits normal range of motion, no swelling and no deformity. Tenderness (distal bicep tendon) found.   Neurological: She is alert. She has normal strength. She is not disoriented. No sensory deficit. GCS eye subscore is 4. GCS verbal subscore is 5. GCS motor subscore is 6.   Skin: Skin is warm, dry and intact. Capillary refill takes less than 2 seconds. She is not diaphoretic.   Psychiatric: She has a normal mood and affect. Her speech is normal and behavior is normal. She is attentive.   Nursing note reviewed.      Assessment:       1. Biceps strain, left, subsequent encounter        Plan:            Patient Instructions: Daily home exercises/warm soaks, MRI to be scheduled once authorized (Continue OTC ibuprofen as needed for pain. )   Restrictions: No lifting/pushing/pulling more than 10 lbs, Limited use of left hand and arm  Return in about 7 days (around 12/22/2017).

## 2018-01-05 ENCOUNTER — OFFICE VISIT (OUTPATIENT)
Dept: OCCUPATIONAL MEDICINE | Facility: CLINIC | Age: 53
End: 2018-01-05

## 2018-01-05 DIAGNOSIS — S46.212D BICEPS STRAIN, LEFT, SUBSEQUENT ENCOUNTER: Primary | ICD-10-CM

## 2018-01-05 PROCEDURE — 99214 OFFICE O/P EST MOD 30 MIN: CPT | Mod: S$GLB,,, | Performed by: PHYSICIAN ASSISTANT

## 2018-01-05 NOTE — PROGRESS NOTES
Subjective:       Patient ID: Katia Arthur is a 52 y.o. female.    Chief Complaint: Arm Pain (INJURY ON 09/21/17)    Pt is here for ea follow up on a LEFT ARM and LEFT SHOULDER injury that happened on 09/21/17.  She states the pain is actually getting worse and she may have to quit her other jobs because she uis unable to perform full duties.  We are still awaitning a MRI which the compnay has to pay for before she gets scheduled.  CT      Arm Pain    The incident occurred more than 1 week ago. The incident occurred at work. The injury mechanism was repetitive motion and twisted. The pain is present in the left shoulder, left elbow and left forearm. The quality of the pain is described as aching and cramping. The pain is at a severity of 6/10. The pain is moderate. The pain has been constant since the incident. Associated symptoms include muscle weakness. Pertinent negatives include no chest pain or numbness. The symptoms are aggravated by lifting, palpation and movement. She has tried NSAIDs, rest and elevation for the symptoms. The treatment provided no relief.     Review of Systems   Constitution: Negative for chills and fever.   HENT: Negative for hearing loss and nosebleeds.    Eyes: Negative for blurred vision and redness.   Cardiovascular: Negative for chest pain and syncope.   Respiratory: Negative for cough and shortness of breath.    Hematologic/Lymphatic: Negative for bleeding problem.   Skin: Negative for color change and rash.   Musculoskeletal: Positive for joint pain (left elbow), muscle cramps, muscle weakness and stiffness. Negative for back pain and neck pain.   Gastrointestinal: Negative for abdominal pain.   Neurological: Negative for numbness and paresthesias.   Psychiatric/Behavioral: The patient is not nervous/anxious.    All other systems reviewed and are negative.      Objective:      Physical Exam   Constitutional: She appears well-developed and well-nourished. She is active. No  distress.   HENT:   Head: Normocephalic and atraumatic.   Right Ear: Hearing and external ear normal.   Left Ear: Hearing and external ear normal.   Nose: Nose normal. No nasal deformity. No epistaxis.   Mouth/Throat: Oropharynx is clear and moist and mucous membranes are normal.   Eyes: Conjunctivae and lids are normal. No scleral icterus.   Neck: Trachea normal and normal range of motion.   Cardiovascular: Intact distal pulses and normal pulses.    Pulmonary/Chest: Effort normal. No stridor. No respiratory distress.   Musculoskeletal:        Left elbow: She exhibits normal range of motion, no swelling and no deformity. Tenderness (AC region) found.        Arms:  Neurological: She is alert. She has normal strength. She is not disoriented. No sensory deficit. GCS eye subscore is 4. GCS verbal subscore is 5. GCS motor subscore is 6.   Skin: Skin is warm, dry and intact. Capillary refill takes less than 2 seconds. She is not diaphoretic.   Psychiatric: She has a normal mood and affect. Her speech is normal and behavior is normal. She is attentive.   Nursing note reviewed.      Assessment:       1. Biceps strain, left, subsequent encounter        Plan:       Company is paying medical bills and does not have enough funds to pay for MRI currently. Company lost  insurance coverage.      Patient Instructions: Daily home exercises/warm soaks, MRI to be scheduled once authorized (Continue ibuprofen as needed for pain. )   Restrictions: Limited use of left hand and arm, No lifting/pushing/pulling more than 10 lbs  Return in about 2 weeks (around 1/19/2018).

## 2018-01-05 NOTE — LETTER
[facility]  [place]  [facility] [place] [address]  Phone: [phone]  Fax: [phone]    Pt Name: Katia Arthur  Injury Date: 09/21/2017   Employee ID:  Date of Treatment: 01/05/2018   Company: [employer].            Appointment Time: 09:05 AM Arrived:  9:20 AM CST   Appointment Date: 01/05/18 Time Out:1030 AM   Physician: Vince Doe PA-C        Office Treatment: Katia was seen today for arm pain.    Diagnoses and all orders for this visit: EXAM, MRI ONCE PAID BY COMPANY, LIGHT DUTY    Biceps strain, left, subsequent encounter     Patient Instructions: Daily home exercises/warm soaks, MRI to be scheduled once authorized (Continue ibuprofen as needed for pain. )    Restrictions: Limited use of left hand and arm, No lifting/pushing/pulling more than 10 lbs       Return Appointment: 01/19/18 @ 0930 AM

## 2018-02-02 ENCOUNTER — TELEPHONE (OUTPATIENT)
Dept: OCCUPATIONAL MEDICINE | Facility: CLINIC | Age: 53
End: 2018-02-02

## 2018-02-02 NOTE — TELEPHONE ENCOUNTER
Pt's employer is refusing to pay for MRI and has stated they are going through a buy out and management change and have lost their WC insurance.      CT

## 2024-07-09 PROBLEM — L55.0 SUNBURN OF FIRST DEGREE: Status: ACTIVE | Noted: 2024-07-09

## 2024-11-12 ENCOUNTER — OFFICE VISIT (OUTPATIENT)
Dept: URGENT CARE | Facility: CLINIC | Age: 59
End: 2024-11-12
Payer: COMMERCIAL

## 2024-11-12 VITALS
BODY MASS INDEX: 36.53 KG/M2 | RESPIRATION RATE: 18 BRPM | OXYGEN SATURATION: 98 % | TEMPERATURE: 99 F | DIASTOLIC BLOOD PRESSURE: 75 MMHG | WEIGHT: 186.06 LBS | HEIGHT: 60 IN | HEART RATE: 62 BPM | SYSTOLIC BLOOD PRESSURE: 139 MMHG

## 2024-11-12 DIAGNOSIS — J02.9 SORE THROAT: ICD-10-CM

## 2024-11-12 DIAGNOSIS — J02.9 VIRAL PHARYNGITIS: Primary | ICD-10-CM

## 2024-11-12 DIAGNOSIS — J04.0 LARYNGITIS: ICD-10-CM

## 2024-11-12 LAB
CTP QC/QA: YES
CTP QC/QA: YES
MOLECULAR STREP A: NEGATIVE
SARS-COV-2 AG RESP QL IA.RAPID: NEGATIVE

## 2024-11-12 PROCEDURE — 87651 STREP A DNA AMP PROBE: CPT | Mod: QW,S$GLB,, | Performed by: NURSE PRACTITIONER

## 2024-11-12 PROCEDURE — 87811 SARS-COV-2 COVID19 W/OPTIC: CPT | Mod: QW,S$GLB,, | Performed by: NURSE PRACTITIONER

## 2024-11-12 PROCEDURE — 99204 OFFICE O/P NEW MOD 45 MIN: CPT | Mod: S$GLB,,, | Performed by: NURSE PRACTITIONER

## 2024-11-12 RX ORDER — LIDOCAINE HYDROCHLORIDE 20 MG/ML
SOLUTION OROPHARYNGEAL
COMMUNITY

## 2024-11-12 RX ORDER — TRIAMCINOLONE ACETONIDE 1 MG/G
CREAM TOPICAL
COMMUNITY

## 2024-11-12 RX ORDER — METHYLPREDNISOLONE 4 MG/1
TABLET ORAL
COMMUNITY
Start: 2024-07-08

## 2024-11-12 RX ORDER — CYANOCOBALAMIN 1000 UG/ML
INJECTION, SOLUTION INTRAMUSCULAR; SUBCUTANEOUS
COMMUNITY

## 2024-11-12 RX ORDER — HYDROXYZINE PAMOATE 25 MG/1
25 CAPSULE ORAL
COMMUNITY
Start: 2024-07-09

## 2024-11-12 RX ORDER — ATORVASTATIN CALCIUM 20 MG/1
TABLET, FILM COATED ORAL
COMMUNITY

## 2024-11-12 NOTE — PROGRESS NOTES
Subjective:      Patient ID: Katia Arthur is a 59 y.o. female.    Vitals:  height is 5' (1.524 m) and weight is 84.4 kg (186 lb 1.1 oz). Her oral temperature is 99 °F (37.2 °C). Her blood pressure is 139/75 and her pulse is 62. Her respiration is 18 and oxygen saturation is 98%.     Chief Complaint: Sore Throat    Pt is a 58 yo female who presents today w/ sore throat accompanied by a non productive cough that began approx 4x days ago. Pt c/o headache, hoarse voice, swollen glands, and trouble swallowing. Pt denies fever and emesis. Pt has not tried anything for relief.     Sore Throat   This is a new problem. The current episode started in the past 7 days. The problem has been unchanged. There has been no fever. The pain is at a severity of 8/10. The pain is severe. Associated symptoms include coughing, headaches, a hoarse voice, swollen glands and trouble swallowing. Pertinent negatives include no abdominal pain, congestion, diarrhea, drooling, ear discharge, ear pain, plugged ear sensation, neck pain, shortness of breath, stridor or vomiting. She has had no exposure to strep or mono. She has tried nothing for the symptoms. The treatment provided no relief.       HENT:  Positive for sore throat and trouble swallowing. Negative for ear pain, ear discharge, drooling and congestion.    Neck: Negative for neck pain.   Respiratory:  Positive for cough. Negative for shortness of breath and stridor.    Gastrointestinal:  Negative for abdominal pain, vomiting and diarrhea.   Neurological:  Positive for headaches.      Objective:     Physical Exam   Constitutional: She is oriented to person, place, and time. She appears well-developed. She is cooperative.  Non-toxic appearance. She does not appear ill. No distress.   HENT:   Head: Normocephalic and atraumatic.   Ears:   Right Ear: Hearing, tympanic membrane, external ear and ear canal normal.   Left Ear: Hearing, tympanic membrane, external ear and ear canal  normal.   Nose: Nose normal. No mucosal edema, rhinorrhea or nasal deformity. No epistaxis. Right sinus exhibits no maxillary sinus tenderness and no frontal sinus tenderness. Left sinus exhibits no maxillary sinus tenderness and no frontal sinus tenderness.   Mouth/Throat: Uvula is midline and mucous membranes are normal. No trismus in the jaw. Normal dentition. No uvula swelling. Posterior oropharyngeal erythema present. No oropharyngeal exudate or posterior oropharyngeal edema. Tonsils are 1+ on the right. Tonsils are 1+ on the left. No tonsillar exudate.   Hoarse voice      Comments: Hoarse voice  Eyes: Conjunctivae and lids are normal. No scleral icterus.   Neck: Trachea normal and phonation normal. Neck supple. No edema present. No erythema present. No neck rigidity present.   Cardiovascular: Normal rate, regular rhythm, normal heart sounds and normal pulses.   Pulmonary/Chest: Effort normal and breath sounds normal. No respiratory distress. She has no decreased breath sounds. She has no rhonchi.   Abdominal: Normal appearance.   Musculoskeletal: Normal range of motion.         General: No deformity. Normal range of motion.   Neurological: She is alert and oriented to person, place, and time. She exhibits normal muscle tone. Coordination normal.   Skin: Skin is warm, dry, intact, not diaphoretic and not pale.   Psychiatric: Her speech is normal and behavior is normal. Judgment and thought content normal.   Nursing note and vitals reviewed.    Results for orders placed or performed in visit on 11/12/24   POCT Strep A, Molecular    Collection Time: 11/12/24  4:13 PM   Result Value Ref Range    Molecular Strep A, POC Negative Negative     Acceptable Yes      Assessment:     1. Viral pharyngitis    2. Sore throat    3. Laryngitis        Plan:       Viral pharyngitis  -     (Magic mouthwash) 1:1:1 diphenhydrAMINE(Benadryl) 12.5mg/5ml liq, aluminum & magnesium hydroxide-simethicone (Maalox), LIDOcaine  viscous 2%; Swish and spit 10 mLs every 4 (four) hours as needed (sore throat).  Dispense: 180 mL; Refill: 0    Sore throat  -     POCT Strep A, Molecular  -     SARS Coronavirus 2 Antigen, POCT Manual Read  -     (Magic mouthwash) 1:1:1 diphenhydrAMINE(Benadryl) 12.5mg/5ml liq, aluminum & magnesium hydroxide-simethicone (Maalox), LIDOcaine viscous 2%; Swish and spit 10 mLs every 4 (four) hours as needed (sore throat).  Dispense: 180 mL; Refill: 0    Laryngitis      Patient Instructions   - You must understand that you have received an Urgent Care treatment only and that you may be released before all of your medical problems are known or treated.   - You, the patient, will arrange for follow up care as instructed.   - If your condition worsens or fails to improve we recommend that you receive another evaluation at the ER immediately or contact your PCP to discuss your concerns.   - You can call [phone] or [phone] to help schedule an appointment with the appropriate provider.    Drink plenty of fluids   Get lots of rest  Tylenol or ibuprofen for pain/fever  Warm salt water gargles for sore throat  Warm tea with honey and lemon for sore throat  Use magic mouthwash as needed for sore throat. Swish, gargle, and spit. Do not swallow

## 2024-11-12 NOTE — PATIENT INSTRUCTIONS
- You must understand that you have received an Urgent Care treatment only and that you may be released before all of your medical problems are known or treated.   - You, the patient, will arrange for follow up care as instructed.   - If your condition worsens or fails to improve we recommend that you receive another evaluation at the ER immediately or contact your PCP to discuss your concerns.   - You can call [phone] or [phone] to help schedule an appointment with the appropriate provider.    Drink plenty of fluids   Get lots of rest  Tylenol or ibuprofen for pain/fever  Warm salt water gargles for sore throat  Warm tea with honey and lemon for sore throat  Use magic mouthwash as needed for sore throat. Swish, gargle, and spit. Do not swallow